# Patient Record
Sex: MALE | URBAN - METROPOLITAN AREA
[De-identification: names, ages, dates, MRNs, and addresses within clinical notes are randomized per-mention and may not be internally consistent; named-entity substitution may affect disease eponyms.]

---

## 2020-10-22 ENCOUNTER — APPOINTMENT (RX ONLY)
Dept: URBAN - METROPOLITAN AREA CLINIC 77 | Facility: CLINIC | Age: 25
Setting detail: DERMATOLOGY
End: 2020-10-22

## 2020-10-22 DIAGNOSIS — I78.1 NEVUS, NON-NEOPLASTIC: ICD-10-CM

## 2020-10-22 PROCEDURE — ? EXCEL V LASER (NON-COSMETIC)

## 2020-10-22 PROCEDURE — 17106 DSTR CUT VSC PRLF LES<10SQCM: CPT

## 2020-10-22 ASSESSMENT — LOCATION SIMPLE DESCRIPTION DERM: LOCATION SIMPLE: NOSE

## 2020-10-22 ASSESSMENT — LOCATION ZONE DERM: LOCATION ZONE: NOSE

## 2020-10-22 ASSESSMENT — LOCATION DETAILED DESCRIPTION DERM: LOCATION DETAILED: NASAL DORSUM

## 2020-10-22 NOTE — PROCEDURE: EXCEL V LASER (NON-COSMETIC)
Laser Type: KTP 532nm
Procedural Text: Cold gel was applied to the treatment areas.The treatment areas where then focally treated as noted above.
Post-Procedure Text: Following the treatment, post-care instructions were discussed.
Pulse Width In Msec: 10
Spot Size: 5
Medical Necessity: 64192
Detail Level: Simple
External Cooling Fan Speed: 3
Fluence In J: 9
Size Of Treatment Area(S) In Cm2: 4
Consent: Written consent obtained, risks reviewed including but not limited to crusting, scabbing, blistering, scarring, darker or lighter pigmentary change, and/or incomplete removal.
Post-Care Instructions: I reviewed with the patient in detail post-care instructions. Patient is to apply vaseline with a q-tip to all crusted areas, and avoid picking at any scabs. Pt should stay away from the sun and wear sun protection until fully healed.